# Patient Record
Sex: MALE | Race: WHITE | NOT HISPANIC OR LATINO | Employment: OTHER | URBAN - METROPOLITAN AREA
[De-identification: names, ages, dates, MRNs, and addresses within clinical notes are randomized per-mention and may not be internally consistent; named-entity substitution may affect disease eponyms.]

---

## 2017-11-08 ENCOUNTER — OFFICE VISIT (OUTPATIENT)
Dept: RADIATION ONCOLOGY | Facility: CLINIC | Age: 77
End: 2017-11-08
Payer: MEDICARE

## 2017-11-08 VITALS
HEIGHT: 67 IN | BODY MASS INDEX: 34.29 KG/M2 | SYSTOLIC BLOOD PRESSURE: 111 MMHG | WEIGHT: 218.5 LBS | RESPIRATION RATE: 22 BRPM | TEMPERATURE: 98 F | DIASTOLIC BLOOD PRESSURE: 53 MMHG | HEART RATE: 69 BPM

## 2017-11-08 DIAGNOSIS — C34.12 PRIMARY MALIGNANT NEOPLASM OF BRONCHUS OF LEFT UPPER LOBE: Primary | ICD-10-CM

## 2017-11-08 PROCEDURE — 99215 OFFICE O/P EST HI 40 MIN: CPT | Mod: ,,, | Performed by: RADIOLOGY

## 2017-11-08 RX ORDER — BENZONATATE 100 MG/1
100 CAPSULE ORAL EVERY 6 HOURS PRN
COMMUNITY
End: 2017-12-04 | Stop reason: SDUPTHER

## 2017-11-08 RX ORDER — HYDROCODONE BITARTRATE AND ACETAMINOPHEN 7.5; 325 MG/1; MG/1
1 TABLET ORAL EVERY 6 HOURS PRN
COMMUNITY

## 2017-11-08 RX ORDER — GUAIFENESIN 600 MG/1
1200 TABLET, EXTENDED RELEASE ORAL EVERY 12 HOURS
COMMUNITY

## 2017-11-08 RX ORDER — ATENOLOL 50 MG/1
50 TABLET ORAL DAILY
COMMUNITY

## 2017-11-08 RX ORDER — ALBUTEROL SULFATE 90 UG/1
2 AEROSOL, METERED RESPIRATORY (INHALATION) EVERY 6 HOURS PRN
COMMUNITY

## 2017-11-08 RX ORDER — ESCITALOPRAM OXALATE 10 MG/1
10 TABLET ORAL DAILY
COMMUNITY

## 2017-11-08 RX ORDER — HYDROCHLOROTHIAZIDE 25 MG/1
25 TABLET ORAL DAILY
COMMUNITY

## 2017-11-08 RX ORDER — FLUTICASONE PROPIONATE 50 MCG
1 SPRAY, SUSPENSION (ML) NASAL DAILY
COMMUNITY

## 2017-11-08 RX ORDER — AMLODIPINE BESYLATE 5 MG/1
5 TABLET ORAL DAILY
COMMUNITY

## 2017-11-08 RX ORDER — ASPIRIN 81 MG/1
81 TABLET ORAL DAILY
COMMUNITY

## 2017-11-08 NOTE — PROGRESS NOTES
Jose Manuel Cat  [id number]  1940 11/8/2017  [facility]  [address] [place] [address]    REASON FOR CONSULTATION: IB, [id number] NSCLC of CYNTHIA  TREATMENT GOAL: concurrent (with chemotherapy)    HISTORY OF PRESENT ILLNESS:   77M p/w hemoptysis and cough  PMHx COPD    *9/17 CT C  -Development of a 4.8 x 3.7 x 3.4 cm spiculated mass in the left  suprahilar region with extension to the hilum and soft tissue thickening  surrounding the left upper lobe bronchus. Findings compatible with malignancy.  Bronchoscopy is recommended.  -Diffuse emphysematous changes  -No CT evidence pulmonary emboli  -Stable 4 mm noncalcified nodule in left upper lobe  *10/17 PET/CT  -4.9 x 4.2 cm left upper lobe mass is diffusely FDG avid reaching maximum SUV of9.2.  -No other abnormal FDG activity.  *10/17 Dr. Wray bronch with bx: nondiagnostic  *10/17 V:Q Scan  -Total function for the right lung is 59.1% in the left lung is 40.9%  *10/17 PFTs  FEV1: 1.86L  DLCO: 43%  *10/17 Dr. Melendez thoracotomy: need L pneumonectomy 2/2 encroachment pulm artery; procedure terminated   - Path: prelim NSCLC    *Dr. Sosa: discussed CRT vs C-RT; pt may seek 2nd opinion; outpt f/u  *Dr Robbins: recommend combined modality therapy    Today patient reports he is been discharged from the hospital ×2 days. He is doing some discomfort in the left chest wall secondary to his surgery. He has had some residual shortness of breath since surgery and is on 2 L of oxygen by nasal cannula. He is denying weight loss, hemoptysis, vision or hearing changes, cognition changes, focal numbness or weakness, gait imbalance, speech difficulty. He does have chronic cough.    Review of Systems   Constitutional: Negative for appetite change, chills, fever and unexpected weight change.   HENT:   Negative for lump/mass, mouth sores, sore throat, trouble swallowing and voice change.    Eyes: Negative for eye problems and icterus.   Respiratory: Positive for cough  and shortness of breath. Negative for chest tightness and hemoptysis.    Cardiovascular: Negative for chest pain and leg swelling.   Gastrointestinal: Negative for abdominal distention, abdominal pain, blood in stool, constipation, diarrhea, nausea and vomiting.   Genitourinary: Negative for bladder incontinence, difficulty urinating, dysuria, frequency, hematuria and nocturia.    Musculoskeletal: Negative for back pain, gait problem, neck pain and neck stiffness.   Neurological: Negative for extremity weakness, gait problem, headaches, numbness and seizures.   Hematological: Negative for adenopathy.   Psychiatric/Behavioral: The patient is nervous/anxious.      Past Medical History:   Diagnosis Date    GERD (gastroesophageal reflux disease)     Lung cancer 2017    WILL (obstructive sleep apnea)     on CPAP    Prostate cancer     s/p prostatectomy 1/28/13 at [facility] with Dr Davis     Past Surgical History:   Procedure Laterality Date    ADENOIDECTOMY      HERNIA REPAIR      KNEE SURGERY  x2    PILONIDAL CYST DRAINAGE  x2    PROSTATECTOMY  1/28/13    done via robotics by Dr Davis at [facility]    TONSILLECTOMY       Social History     Social History    Marital status:      Spouse name: N/A    Number of children: N/A    Years of education: N/A     Social History Main Topics    Smoking status: Former Smoker     Packs/day: 1.00     Quit date: 3/12/2008    Smokeless tobacco: Never Used    Alcohol use 0.6 oz/week     1 Shots of liquor per week      Comment: martini with three olives    Drug use: No    Sexual activity: Not Currently     Other Topics Concern    None     Social History Narrative    None     Family History   Problem Relation Age of Onset    Allergies Neg Hx     Angioedema Neg Hx     Asthma Neg Hx     Eczema Neg Hx     Immunodeficiency Neg Hx     Allergic rhinitis Neg Hx     Atopy Neg Hx     Rhinitis Neg Hx     Urticaria Neg Hx        PRIOR HISTORY OF CHEMOTHERAPY OR  "RADIOTHERAPY: Please see HPI for patients prior oncologic history.    Medication List with Changes/Refills   Current Medications    ALBUTEROL 90 MCG/ACTUATION INHALER    Inhale 2 puffs into the lungs every 6 (six) hours as needed. Rescue    AMLODIPINE (NORVASC) 5 MG TABLET    Take 5 mg by mouth once daily.    ASPIRIN (ECOTRIN) 81 MG EC TABLET    Take 81 mg by mouth once daily.    ATENOLOL (TENORMIN) 50 MG TABLET    Take 50 mg by mouth once daily.    BENZONATATE (TESSALON) 100 MG CAPSULE    Take 100 mg by mouth every 6 (six) hours as needed.    ESCITALOPRAM OXALATE (LEXAPRO) 10 MG TABLET    Take 10 mg by mouth once daily.    FLUTICASONE (FLONASE) 50 MCG/ACTUATION NASAL SPRAY    1 spray by Each Nare route once daily.    GUAIFENESIN (MUCINEX) 600 MG 12 HR TABLET    Take 1,200 mg by mouth every 12 (twelve) hours.    HYDROCHLOROTHIAZIDE (HYDRODIURIL) 25 MG TABLET    Take 25 mg by mouth once daily.    HYDROCODONE-ACETAMINOPHEN 7.5-325MG (NORCO) 7.5-325 MG PER TABLET    Take 1 tablet by mouth every 6 (six) hours as needed.    MOMETASONE (NASONEX) 50 MCG/ACTUATION NASAL SPRAY    2 sprays by Nasal route once daily.    MONTELUKAST (SINGULAIR) 10 MG TABLET        OMEPRAZOLE (PRILOSEC) 40 MG CAPSULE        RANITIDINE (ZANTAC) 150 MG CAPSULE    Take 150 mg by mouth 2 (two) times daily.    SULFAMETHOXAZOLE-TRIMETHOPRIM 400-80MG (BACTRIM,SEPTRA) 400-80 MG PER TABLET        SYMBICORT 160-4.5 MCG/ACTUATION HFAA        TRIBENZOR 40-5-25 MG TAB        UMECLIDINIUM-VILANTEROL 62.5-25 MCG/ACTUATION DSDV    Inhale 1 puff into the lungs once daily. Controller     Review of patient's allergies indicates:  No Known Allergies    QUALITY OF LIFE: 80%- Normal Activity with Effort: Some Symptoms of Disease    Vitals:    11/08/17 1328   BP: (!) 111/53   Pulse: 69   Resp: (!) 22   Temp: 97.9 °F (36.6 °C)   TempSrc: Oral   Weight: 99.1 kg (218 lb 8 oz)   Height: 5' 7" (1.702 m)   PainSc:   4   PainLoc: Abdomen       PHYSICAL EXAM:   GENERAL: " alert; in no apparent distress.   HEAD: normocephalic, atraumatic.  EYES: pupils are equal, round, reactive to light and accommodation. Sclera anicteric. Conjunctiva not injected.   NOSE/THROAT: no nasal erythema or rhinorrhea. Oropharynx pink, without erythema, ulcerations or thrush.   NECK: no cervical motion rigidity; supple with no masses.  CHEST: clear to auscultation bilaterally; no wheezes, crackles or rubs. Good WOB; poor air mvmt; on O2 by NC at 2L. Incisions with minimal serous drainage at R lateral chest wall  CARDIOVASCULAR: regular rate and rhythm; no murmurs, rubs or gallops.  ABDOMEN: soft, nontender, nondistended. Bowel sounds present. Obese; vivienne umbilical hernia  MUSCULOSKELETAL: no tenderness to palpation along the spine or scapulae. Normal range of motion.  NEUROLOGIC: cranial nerves II-XII intact bilaterally. Strength 5/5 in bilateral upper and lower extremities. No sensory deficits appreciated. Normal gait.  LYMPHATIC: no cervical, supraclavicular adenopathy appreciated bilaterally.   EXTREMITIES: no clubbing, cyanosis, edema.    REVIEW OF IMAGING/PATHOLOGY/LABS: Please see HPI. All images reviewed personally by dictating physician.     ASSESSMENT: 77M with stage IB, [id number] NSCLC of CYNTHIA.  PLAN:   Mr. Cat met with Dr. Sosa as an inpatient after he was unable to safely proceed with resection which would've required a pneumonectomy. At that time Dr. Sosa discussed the role of combined modality therapy as definitive therapy for patient's diagnosis of lung cancer. I again explained the roles of surgery versus chemoradiation therapy in extensive detail to the patient   who has significant anxiety regarding his diagnosis. Patient has two metal knee replacements and likely is not candidate for MRI brain staging but per my ROS and PE today has no CNS complaints warranting CNS staging, which is optional in stage IB disease. I explained to him that I would like to give him 2 weeks to continue  to heal from his surgery before proceeding with definitive therapy for his left lung malignancy. Although there is no lymph node involvement the tumor does track towards the mediastinum making stereotactic body radiation therapy not safely feasible. My recommendation is for 60 gray using standard fractionation provided with concurrent chemotherapy which I think he could tolerate but defer to Dr. Robbins. If he is treated sequentially, chemotherapy will likely precede radiotherapy. I also explained that we are still awaiting the specific subtype of non-small cell lung cancer and that this may be used to direct his systemic therapy. Per pulm function test results above, I do believe he can tolerate definitive radiotherapy. I had an extensive discussion with he and his wife today and they would like to proceed with therapy. I recommended he continue to follow with Dr. Wray regarding his pulmonary function and it is my hope he will be off of supplemental oxygen at the time we begin radiotherapy.     We discussed the risks and benefits of the above treatment and have gone over in detail the acute and late toxicities of radiation therapy to the left lung. The patient expressed  understanding and has signed a consent form which is included in the patients chart. The patient has our contact information and understands that they are free to contact us at any time with questions or concerns regarding radiation therapy.    DISPOSITION: RTC FOR CT SIM x 2wks    TIME SPENT WITH PATIENT: I have personally seen and evaluated this patient. Approximately one hour was spent in consultation with the patient, greater than 50% of this time was spent discussing the personalized oncologic treatment plan and details of radiation therapy with the patient.     PHYSICIAN: Carlos Bella Jr, MD

## 2017-11-19 ENCOUNTER — DOCUMENTATION ONLY (OUTPATIENT)
Dept: RADIATION ONCOLOGY | Facility: CLINIC | Age: 77
End: 2017-11-19

## 2017-11-19 NOTE — PROGRESS NOTES
Jose Manuel Cat  [id number]  1940 11/19/2017  No referring provider defined for this encounter.    DIAGNOSIS: Primary malignant neoplasm of bronchus of left upper lobe    Staging form: Lung, AJCC 7th Edition    - Clinical: Stage IB (T2a, N0, M0) - Signed by Carlos Bella Jr., MD on 11/8/2017    TREATMENT SITE(S): left upper lobe    INTENT: CURATIVE    TREATMENT SETTING: CONCURRENT     MODALITY: PHOTON    TECHNIQUE:  3DCRT    IMRT MEDICAL NECESSITY: na    I have personally performed treatment planning for the patient, reviewing relevant history/physical and imaging. I have defined GTV, CTV, PTV and organs at risk.     In order to accomplish this plan, I am ordering:  SIMULATION: CT SIMULATION FOR PLACEMENT OF TREATMENT FIELDS    CONTRAST: IV    TO ACCOMPLISH REPRODUCIBLE POSITION: VACLOC    DEVICES FOR BEAM SHAPING: CUSTOMIZED MLC    CUSTOMIZED BOLUS: none    IMAGING: CBCT DAILY    I have ordered a weekly physics check.    SPECIAL PHYSICS CONSULT: NO  REASON: N/A    SPECIAL TREATMENT CIRCUMSTANCE: YES  Concurrent or recent administration of chemotherapeutic agents which are known potent radiosensitizers and thus will require vigilant monitoring for  exaggerated radiation toxicities.    LABS: NONE    ANTICIPATED PRESCRIPTION: 6000cGy/30frx to CYNTHIA using 6/23X to isocenter    TREATMENT: DAILY    PHYSICIAN: Carlos Bella Jr, MD

## 2017-11-21 ENCOUNTER — TREATMENT (OUTPATIENT)
Dept: RADIATION ONCOLOGY | Facility: CLINIC | Age: 77
End: 2017-11-21
Payer: MEDICARE

## 2017-11-21 PROCEDURE — 77334 RADIATION TREATMENT AID(S): CPT | Mod: ,,, | Performed by: RADIOLOGY

## 2017-11-21 PROCEDURE — 77290 THER RAD SIMULAJ FIELD CPLX: CPT | Mod: ,,, | Performed by: RADIOLOGY

## 2017-11-21 PROCEDURE — 77263 THER RADIOLOGY TX PLNG CPLX: CPT | Mod: ,,, | Performed by: RADIOLOGY

## 2017-11-21 PROCEDURE — 77470 SPECIAL RADIATION TREATMENT: CPT | Mod: ,,, | Performed by: RADIOLOGY

## 2017-11-22 PROCEDURE — 77295 3-D RADIOTHERAPY PLAN: CPT | Mod: ,,, | Performed by: RADIOLOGY

## 2017-11-22 PROCEDURE — 77300 RADIATION THERAPY DOSE PLAN: CPT | Mod: ,,, | Performed by: RADIOLOGY

## 2017-11-22 PROCEDURE — 77334 RADIATION TREATMENT AID(S): CPT | Mod: ,,, | Performed by: RADIOLOGY

## 2017-11-27 RX ORDER — FOLIC ACID-PYRIDOXINE-CYANOCOBALAMIN TAB 2.5-25-2 MG 2.5-25-2 MG
TAB ORAL
Qty: 90 TABLET | Refills: 0 | OUTPATIENT
Start: 2017-11-27

## 2017-11-29 ENCOUNTER — DOCUMENTATION ONLY (OUTPATIENT)
Dept: RADIATION ONCOLOGY | Facility: CLINIC | Age: 77
End: 2017-11-29

## 2017-11-29 PROCEDURE — 77427 RADIATION TX MANAGEMENT X5: CPT | Mod: ,,, | Performed by: RADIOLOGY

## 2017-11-29 NOTE — PROGRESS NOTES
NUTRITION NOTE    Jose Manuel is a 77 year old male with lung cancer getting combined radiation and weekly carboplatin/paclitaxel.  Weight: 210 #.  Wife denies any weight loss, appetite is good.   Wife states it is a challenge getting in enough fluids.  Plan: Discussed importance of nutrition with cancer treatment. Discouraged intentional weight loss at this time.  2. Discussed importance of hydration and advised he aim for 90 ounces daily. Advised he could get fluid from melons, juice, broth, popsickles, gatorade.  3. Discussed food safety and discouraged use of herb and vitamin supplements.  4. Discussed diet for radiation induced esophagitis, small bites, chew well, add gravy, avoid gastric stimulats, soft foods etc.  5. Gave diet for diarrhea should it occur with treatment.  6. Gave eating tips with nausea and discovered he did not have nausea meds.  Amy Lima states zofran and phenergan called into [facility] [place].  [facility] [place]. Patient wife told to have [facility] [place] call [facility] [place] to get prescriptions. I reminded her that his best weapon for nausea is MEDICATION.  7. Answered questions regarding sugar and cancer, alkaline diet, and vitamins.  8. RD contact information left with wife.  9. Gave samples/coupons of nutrition supplements.

## 2017-11-30 ENCOUNTER — DOCUMENTATION ONLY (OUTPATIENT)
Dept: HEMATOLOGY/ONCOLOGY | Facility: CLINIC | Age: 77
End: 2017-11-30

## 2017-11-30 NOTE — PROGRESS NOTES
Patient stopped by the clinic he had questions regarding his reaction to Taxol yesterday. Wanted to know who the MD was- notified him it was Dr. Bee. He also stated they MD said he would get steroids before, but he was not given anything. Spoke with Balaji at Dr. Branch they were aware. She is sending the steroids to his pharmacy [facility] in [place]. Patient notified he needs to pick them up.

## 2017-12-04 ENCOUNTER — TREATMENT (OUTPATIENT)
Dept: RADIATION ONCOLOGY | Facility: CLINIC | Age: 77
End: 2017-12-04
Payer: MEDICARE

## 2017-12-04 DIAGNOSIS — C34.12 PRIMARY MALIGNANT NEOPLASM OF LEFT UPPER LOBE OF LUNG: Primary | ICD-10-CM

## 2017-12-04 PROCEDURE — 77014 PR  CT GUIDANCE PLACEMENT RAD THERAPY FIELDS: CPT | Mod: ,,, | Performed by: RADIOLOGY

## 2017-12-04 PROCEDURE — G6014 RADIATION TREATMENT DELIVERY: HCPCS | Mod: ,,, | Performed by: RADIOLOGY

## 2017-12-04 RX ORDER — BENZONATATE 100 MG/1
100 CAPSULE ORAL EVERY 6 HOURS PRN
Qty: 120 CAPSULE | Refills: 3 | Status: SHIPPED | OUTPATIENT
Start: 2017-12-04 | End: 2018-01-23 | Stop reason: SDUPTHER

## 2017-12-13 ENCOUNTER — TREATMENT (OUTPATIENT)
Dept: RADIATION ONCOLOGY | Facility: CLINIC | Age: 77
End: 2017-12-13
Payer: MEDICARE

## 2017-12-13 PROCEDURE — 77427 RADIATION TX MANAGEMENT X5: CPT | Mod: ,,, | Performed by: RADIOLOGY

## 2017-12-13 PROCEDURE — 77014 PR  CT GUIDANCE PLACEMENT RAD THERAPY FIELDS: CPT | Mod: ,,, | Performed by: RADIOLOGY

## 2017-12-13 PROCEDURE — G6014 RADIATION TREATMENT DELIVERY: HCPCS | Mod: ,,, | Performed by: RADIOLOGY

## 2017-12-20 ENCOUNTER — TREATMENT (OUTPATIENT)
Dept: RADIATION ONCOLOGY | Facility: CLINIC | Age: 77
End: 2017-12-20
Payer: MEDICARE

## 2017-12-20 DIAGNOSIS — C34.90 MALIGNANT NEOPLASM OF LUNG, UNSPECIFIED LATERALITY, UNSPECIFIED PART OF LUNG: Primary | ICD-10-CM

## 2017-12-20 PROCEDURE — 77427 RADIATION TX MANAGEMENT X5: CPT | Mod: ,,, | Performed by: RADIOLOGY

## 2017-12-20 PROCEDURE — G6014 RADIATION TREATMENT DELIVERY: HCPCS | Mod: ,,, | Performed by: RADIOLOGY

## 2017-12-20 PROCEDURE — 77014 PR  CT GUIDANCE PLACEMENT RAD THERAPY FIELDS: CPT | Mod: ,,, | Performed by: RADIOLOGY

## 2017-12-20 RX ORDER — DEXAMETHASONE 4 MG/1
4 TABLET ORAL EVERY 12 HOURS
Qty: 60 TABLET | Refills: 1 | Status: SHIPPED | OUTPATIENT
Start: 2017-12-20

## 2017-12-27 ENCOUNTER — TREATMENT (OUTPATIENT)
Dept: RADIATION ONCOLOGY | Facility: CLINIC | Age: 77
End: 2017-12-27
Payer: MEDICARE

## 2017-12-27 PROCEDURE — G6014 RADIATION TREATMENT DELIVERY: HCPCS | Mod: ,,, | Performed by: RADIOLOGY

## 2017-12-27 PROCEDURE — 77014 PR  CT GUIDANCE PLACEMENT RAD THERAPY FIELDS: CPT | Mod: ,,, | Performed by: RADIOLOGY

## 2017-12-27 PROCEDURE — 77336 RADIATION PHYSICS CONSULT: CPT | Mod: ,,, | Performed by: RADIOLOGY

## 2017-12-28 PROCEDURE — 77427 RADIATION TX MANAGEMENT X5: CPT | Mod: ,,, | Performed by: RADIOLOGY

## 2018-01-03 ENCOUNTER — TREATMENT (OUTPATIENT)
Dept: RADIATION ONCOLOGY | Facility: CLINIC | Age: 78
End: 2018-01-03
Payer: MEDICARE

## 2018-01-03 PROCEDURE — G6014 RADIATION TREATMENT DELIVERY: HCPCS | Mod: ,,, | Performed by: RADIOLOGY

## 2018-01-03 PROCEDURE — 77014 PR  CT GUIDANCE PLACEMENT RAD THERAPY FIELDS: CPT | Mod: ,,, | Performed by: RADIOLOGY

## 2018-01-10 ENCOUNTER — TREATMENT (OUTPATIENT)
Dept: RADIATION ONCOLOGY | Facility: CLINIC | Age: 78
End: 2018-01-10
Payer: MEDICARE

## 2018-01-10 PROCEDURE — G6014 RADIATION TREATMENT DELIVERY: HCPCS | Mod: ,,, | Performed by: RADIOLOGY

## 2018-01-10 PROCEDURE — 77014 PR  CT GUIDANCE PLACEMENT RAD THERAPY FIELDS: CPT | Mod: ,,, | Performed by: RADIOLOGY

## 2018-01-11 ENCOUNTER — TREATMENT (OUTPATIENT)
Dept: RADIATION ONCOLOGY | Facility: CLINIC | Age: 78
End: 2018-01-11
Payer: MEDICARE

## 2018-01-11 PROCEDURE — 77336 RADIATION PHYSICS CONSULT: CPT | Mod: ,,, | Performed by: RADIOLOGY

## 2018-01-11 PROCEDURE — G6014 RADIATION TREATMENT DELIVERY: HCPCS | Mod: ,,, | Performed by: RADIOLOGY

## 2018-01-11 PROCEDURE — G6002 STEREOSCOPIC X-RAY GUIDANCE: HCPCS | Mod: ,,, | Performed by: RADIOLOGY

## 2018-01-23 DIAGNOSIS — C34.12 PRIMARY MALIGNANT NEOPLASM OF LEFT UPPER LOBE OF LUNG: ICD-10-CM

## 2018-01-23 RX ORDER — BENZONATATE 100 MG/1
100 CAPSULE ORAL EVERY 6 HOURS PRN
Qty: 120 CAPSULE | Refills: 3 | Status: SHIPPED | OUTPATIENT
Start: 2018-01-23